# Patient Record
Sex: FEMALE | Race: WHITE | NOT HISPANIC OR LATINO | URBAN - METROPOLITAN AREA
[De-identification: names, ages, dates, MRNs, and addresses within clinical notes are randomized per-mention and may not be internally consistent; named-entity substitution may affect disease eponyms.]

---

## 2019-12-10 DIAGNOSIS — O35.HXX0 SHORT FEMUR OF FETUS ON PRENATAL ULTRASOUND: ICD-10-CM

## 2019-12-10 DIAGNOSIS — Z36.89 ENCOUNTER FOR FETAL ANATOMIC SURVEY: Primary | ICD-10-CM

## 2019-12-11 DIAGNOSIS — Z36.89 ENCOUNTER FOR FETAL ANATOMIC SURVEY: Primary | ICD-10-CM

## 2019-12-17 ENCOUNTER — TELEPHONE (OUTPATIENT)
Dept: MATERNAL FETAL MEDICINE | Facility: CLINIC | Age: 29
End: 2019-12-17

## 2019-12-18 ENCOUNTER — OFFICE VISIT (OUTPATIENT)
Dept: MATERNAL FETAL MEDICINE | Facility: CLINIC | Age: 29
End: 2019-12-18
Payer: OTHER GOVERNMENT

## 2019-12-18 VITALS
BODY MASS INDEX: 24.49 KG/M2 | HEIGHT: 65 IN | DIASTOLIC BLOOD PRESSURE: 86 MMHG | SYSTOLIC BLOOD PRESSURE: 126 MMHG | WEIGHT: 147 LBS

## 2019-12-18 DIAGNOSIS — Z36.9 ENCOUNTER FOR FETAL ULTRASOUND: ICD-10-CM

## 2019-12-18 DIAGNOSIS — Z36.89 ENCOUNTER FOR FETAL ANATOMIC SURVEY: ICD-10-CM

## 2019-12-18 DIAGNOSIS — O09.899 SINGLE UMBILICAL ARTERY AFFECTING MANAGEMENT OF MOTHER IN SINGLETON PREGNANCY, ANTEPARTUM: ICD-10-CM

## 2019-12-18 DIAGNOSIS — O35.HXX0 SHORT FEMUR OF FETUS ON PRENATAL ULTRASOUND: ICD-10-CM

## 2019-12-18 DIAGNOSIS — R93.89 ABNORMAL FINDING ON ULTRASOUND: Primary | ICD-10-CM

## 2019-12-18 PROCEDURE — 99202 PR OFFICE/OUTPT VISIT, NEW, LEVL II, 15-29 MIN: ICD-10-PCS | Mod: 25,,, | Performed by: OBSTETRICS & GYNECOLOGY

## 2019-12-18 PROCEDURE — 76819 PR US, OB, FETAL BIOPHYSICAL, W/O NST: ICD-10-PCS | Mod: ,,, | Performed by: OBSTETRICS & GYNECOLOGY

## 2019-12-18 PROCEDURE — 99202 OFFICE O/P NEW SF 15 MIN: CPT | Mod: 25,,, | Performed by: OBSTETRICS & GYNECOLOGY

## 2019-12-18 PROCEDURE — 76819 FETAL BIOPHYS PROFIL W/O NST: CPT | Mod: ,,, | Performed by: OBSTETRICS & GYNECOLOGY

## 2019-12-18 PROCEDURE — 76811 PR US, OB FETAL EVAL & EXAM, TRANSABDOM,FIRST GESTATION: ICD-10-PCS | Mod: ,,, | Performed by: OBSTETRICS & GYNECOLOGY

## 2019-12-18 PROCEDURE — 76811 OB US DETAILED SNGL FETUS: CPT | Mod: ,,, | Performed by: OBSTETRICS & GYNECOLOGY

## 2019-12-18 PROCEDURE — 76820 UMBILICAL ARTERY ECHO: CPT | Mod: ,,, | Performed by: OBSTETRICS & GYNECOLOGY

## 2019-12-18 PROCEDURE — 76820 PR US, OB DOPPLER, FETAL UMBILICAL ARTERY ECHO: ICD-10-PCS | Mod: ,,, | Performed by: OBSTETRICS & GYNECOLOGY

## 2019-12-18 NOTE — PROGRESS NOTES
Chief complaint: SUA    Provider requesting consultation: Dr. Palacios    29 y.o. [id number] Unknown EGA    PMH:  Past Medical History:   Diagnosis Date    SAB (spontaneous )     Varicose veins of left lower extremity        PObHx:  OB History    Para Term  AB Living   4 2 2   1 2   SAB TAB Ectopic Multiple Live Births   1       2      # Outcome Date GA Lbr Paco/2nd Weight Sex Delivery Anes PTL Lv   4 Current            3 Term 10/20/18 39w0d  3.6 kg (7 lb 15 oz) M Vag-Spont   ZUHAIR   2 Term 12/24/15 41w0d  3.799 kg (8 lb 6 oz) M Vag-Spont   ZUHAIR   1 SAB 2014               PSH:  Past Surgical History:   Procedure Laterality Date    REFRACTIVE SURGERY      WISDOM TOOTH EXTRACTION         Family history:family history is not on file.    Social history: reports that she has never smoked. She has never used smokeless tobacco. She reports that she drank alcohol. She reports that she does not use drugs.    A detailed fetal anatomical ultrasound was completed today.  See details in imaging section of [facility].      The patient was seen because of a Single Umbilical Artery noted on an outside ultrasound and confirmed during her ultrasound at today's visit.   No other structural abnormalities were noted with the exception of short femur and humerus, both < 1%ile. Please see the ultrasound report for a full report.    Single umbilical artery occurs in approximately 0.5% to 1.0% of pregnancies. We discussed that the vessels of the umbilical cord form early in pregnancy between the 13th and 38th days after conception. The most common explanation for a single umbilical artery is secondary atrophy of one of the arteries. SUA can be associated with other congenital anomalies, genetic syndromes, or chromosome abnormalities. We discussed that the finding of a PIERCE A with no other congenital anomalies in the fetus is not associated with an increased risk of chromosomal abnormalities: therefore, invasive prenatal testing  is not indicated. There have been reports of an increased incidence of congenital heart defects when a SUA is seen; therefore, fetal echocardiography is warranted. We obtained excellent cardiac views today and no abnormalities were seen.  Also, approximately 7-10% of fetuses with an SUA and no other abnormality seen on prenatal US have an anomaly discovered after birth. Therefore it was recommended that the baby be evaluated after birth for any abnormality.   Fetuses with a SUA can also have IUGR, the EFW on today's scan is skewed by the long bone measurements.      The approximate physician face-to-face time was 15 minutes. The majority of the time (>50%) was spent on counseling of the patient or coordination of care.  The patient was given an opportunity to ask questions about management and the diease process.  She expressed an understanding of and agreement to the above impression and plan. All questions were answered to her satisfaction.  She was given contact information to the [facility] clinic to address further concerns.